# Patient Record
Sex: FEMALE | Race: BLACK OR AFRICAN AMERICAN | Employment: FULL TIME | URBAN - METROPOLITAN AREA
[De-identification: names, ages, dates, MRNs, and addresses within clinical notes are randomized per-mention and may not be internally consistent; named-entity substitution may affect disease eponyms.]

---

## 2018-03-16 VITALS
HEIGHT: 66 IN | WEIGHT: 222 LBS | HEART RATE: 77 BPM | DIASTOLIC BLOOD PRESSURE: 73 MMHG | RESPIRATION RATE: 16 BRPM | BODY MASS INDEX: 35.68 KG/M2 | OXYGEN SATURATION: 100 % | TEMPERATURE: 98 F | SYSTOLIC BLOOD PRESSURE: 143 MMHG

## 2018-03-16 PROCEDURE — 99282 EMERGENCY DEPT VISIT SF MDM: CPT

## 2018-03-17 ENCOUNTER — HOSPITAL ENCOUNTER (EMERGENCY)
Age: 43
Discharge: HOME | End: 2018-03-17
Attending: EMERGENCY MEDICINE
Payer: COMMERCIAL

## 2018-03-17 DIAGNOSIS — R68.89 FLU-LIKE SYMPTOMS: ICD-10-CM

## 2018-03-17 DIAGNOSIS — J02.9 ACUTE PHARYNGITIS, UNSPECIFIED ETIOLOGY: Primary | ICD-10-CM

## 2018-03-17 PROCEDURE — 87081 CULTURE SCREEN ONLY: CPT | Performed by: EMERGENCY MEDICINE

## 2018-03-17 RX ORDER — OSELTAMIVIR PHOSPHATE 75 MG/1
75 CAPSULE ORAL 2 TIMES DAILY
Qty: 10 CAP | Refills: 0 | Status: SHIPPED | OUTPATIENT
Start: 2018-03-17 | End: 2018-03-22

## 2018-03-17 RX ORDER — GUAIFENESIN AND PSEUDOEPHEDRINE HCL 1200; 120 MG/1; MG/1
1 TABLET, EXTENDED RELEASE ORAL 2 TIMES DAILY
Qty: 30 TAB | Refills: 0 | Status: SHIPPED | OUTPATIENT
Start: 2018-03-17

## 2018-03-17 NOTE — ED NOTES
Assumed care of patient. Patient presents with complaints of sore throat, cough and body aches x 2 days, also c/o rash to mouth. Denies any fever. Patient changed into gown and warm blankets provided. Call bell within reach of patient. Will continue to monitor and assess.

## 2018-03-17 NOTE — ED PROVIDER NOTES
EMERGENCY DEPARTMENT HISTORY AND PHYSICAL EXAM    Date: 3/17/2018  Patient Name: Sary Vance    History of Presenting Illness     Chief Complaint   Patient presents with    Flu    Rash         History Provided By: Patient    Chief Complaint: Rash to mouth  Duration: 2 days   Timing:  Constant  Location: Mouth  Severity: 8 out of 10  Associated Symptoms: sore throat and body aches    Additional History (Context):   1:29 AM  Sary Vance is a 37 y.o. female with PMHx HTN who presents ambulatory to the emergency department C/O rash to mouth (rated 8/10), onset 2 days ago. Associated sxs include sore throat and body aches. Pt reports that she is a  and has come into contact with at least 6 students with flu. Notes FHx of DM and CA. Pt denies fever, tobacco/EtOH/illicit drug use or any other sxs or complaints. PCP: Maryjane Baldwin MD    Current Outpatient Prescriptions   Medication Sig Dispense Refill    oseltamivir (TAMIFLU) 75 mg capsule Take 1 Cap by mouth two (2) times a day for 5 days. 10 Cap 0    PSEUDOEPHEDRINE-guaiFENesin (MUCINEX D MAXIMUM STRENGTH) Tb12 extended release tablet Take 1 Tab by mouth two (2) times a day. 30 Tab 0    [START ON 3/19/2018] zinc lozenges 25 mg lozg Take 30 Each by mouth every Monday and Friday. 40 Lozenge 0    TOPIRAMATE (TOPAMAX PO) Take  by mouth.  LOSARTAN-HYDROCHLOROTHIAZIDE PO Take  by mouth.  MONTELUKAST SODIUM (SINGULAIR PO) Take  by mouth.  FEXOFENADINE HCL (ALLEGRA PO) Take  by mouth. Past History     Past Medical History:  Past Medical History:   Diagnosis Date    H/O seasonal allergies     Hypertension     Migraine        Past Surgical History:  Past Surgical History:   Procedure Laterality Date    HX GYN          HX GYN      dx'd lap    HX GYN      hysterectomy       Family History:  History reviewed. No pertinent family history.     Social History:  Social History   Substance Use Topics    Smoking status: Never Smoker    Smokeless tobacco: None    Alcohol use None       Allergies:  No Known Allergies      Review of Systems   Review of Systems   Constitutional: Negative for chills, diaphoresis, fever and unexpected weight change. HENT: Positive for sore throat. Negative for congestion, drooling, ear pain, rhinorrhea, tinnitus and trouble swallowing. Eyes: Negative for photophobia, pain, redness and visual disturbance. Respiratory: Negative for cough, choking, chest tightness, shortness of breath, wheezing and stridor. Cardiovascular: Negative for chest pain, palpitations and leg swelling. Gastrointestinal: Negative for abdominal distention, abdominal pain, anal bleeding, blood in stool, constipation, diarrhea, nausea and vomiting. Genitourinary: Negative for difficulty urinating, dysuria, flank pain, frequency, hematuria and urgency. Musculoskeletal: Positive for myalgias (body aches). Negative for arthralgias, back pain and neck pain. Skin: Positive for rash (mouth). Negative for color change and wound. Neurological: Negative for dizziness, seizures, syncope, speech difficulty, light-headedness and headaches. Hematological: Does not bruise/bleed easily. Psychiatric/Behavioral: Negative for agitation, behavioral problems, hallucinations, self-injury and suicidal ideas. The patient is not hyperactive. Physical Exam     Vitals:    03/16/18 2251   BP: 143/73   Pulse: 77   Resp: 16   Temp: 98 °F (36.7 °C)   SpO2: 100%   Weight: 100.7 kg (222 lb)   Height: 5' 6\" (1.676 m)     Physical Exam   Constitutional: She is oriented to person, place, and time. She appears well-developed and well-nourished. No distress. Overweight, well apparing, nontoxic   HENT:   Head: Normocephalic and atraumatic. Right Ear: External ear normal.   Left Ear: External ear normal.   Mouth/Throat: Posterior oropharyngeal edema and posterior oropharyngeal erythema present. No oropharyngeal exudate. Throat erythema and hypertrophy, no exudates. Posterior pharynx tough to see. Mallampati 3 however, strong gag reflex and good inspection during strep throat swab reveals essentially normal findings. Eyes: Conjunctivae and EOM are normal. Pupils are equal, round, and reactive to light. No scleral icterus. No pallor   Neck: Normal range of motion. Neck supple. No JVD present. No tracheal deviation present. No thyromegaly present. Cardiovascular: Normal rate, regular rhythm and normal heart sounds. Pulmonary/Chest: Effort normal and breath sounds normal. No stridor. No respiratory distress. Bronchospastic cough   Abdominal: Soft. Bowel sounds are normal. She exhibits no distension. There is no tenderness. There is no rebound and no guarding. Musculoskeletal: Normal range of motion. She exhibits no edema or tenderness. No soft tissue injuries   Lymphadenopathy:     She has no cervical adenopathy. Neurological: She is alert and oriented to person, place, and time. She has normal reflexes. No cranial nerve deficit. Coordination normal.   Skin: Skin is warm and dry. No rash noted. She is not diaphoretic. No erythema. Psychiatric: She has a normal mood and affect. Her behavior is normal. Judgment and thought content normal.   Nursing note and vitals reviewed. Diagnostic Study Results     Labs -     Recent Results (from the past 12 hour(s))   STREP THROAT SCREEN    Collection Time: 03/17/18  1:39 AM   Result Value Ref Range    Special Requests: NO SPECIAL REQUESTS      Strep Screen NEGATIVE       Strep Screen (NOTE)  TEST PERFORMED IN LAB BY 1056         Culture result: PENDING        Radiologic Studies -    No orders to display     CT Results  (Last 48 hours)    None        CXR Results  (Last 48 hours)    None          MEDICATIONS GIVEN:  Medications - No data to display    Medical Decision Making   I am the first provider for this patient.     I reviewed the vital signs, available nursing notes, past medical history, past surgical history, family history and social history. Vital Signs-Reviewed the patient's vital signs. Pulse Oximetry Analysis - 100% on RA     Records Reviewed: Nursing Notes    Provider Notes (Medical Decision Making):   Ddx: URI, flu, or parainfluenza as well as coexisting bronchitis and/or PNA. Procedures:  Procedures    ED Course:   1:29 AM   Initial assessment performed. The patients presenting problems have been discussed, and they are in agreement with the care plan formulated and outlined with them. I have encouraged them to ask questions as they arise throughout their visit. Diagnosis and Disposition       DISCHARGE NOTE:  2:33 AM  Bosie Olszewski D Rouse's  results have been reviewed with her. She has been counseled regarding her diagnosis, treatment, and plan. She verbally conveys understanding and agreement of the signs, symptoms, diagnosis, treatment and prognosis and additionally agrees to follow up as discussed. She also agrees with the care-plan and conveys that all of her questions have been answered. I have also provided discharge instructions for her that include: educational information regarding their diagnosis and treatment, and list of reasons why they would want to return to the ED prior to their follow-up appointment, should her condition change. She has been provided with education for proper emergency department utilization. CLINICAL IMPRESSION:    1. Acute pharyngitis, unspecified etiology    2. Flu-like symptoms        PLAN:  1. D/C Home  2. Discharge Medication List as of 3/17/2018  2:33 AM      START taking these medications    Details   oseltamivir (TAMIFLU) 75 mg capsule Take 1 Cap by mouth two (2) times a day for 5 days. , Print, Disp-10 Cap, R-0      PSEUDOEPHEDRINE-guaiFENesin (MUCINEX D MAXIMUM STRENGTH) Tb12 extended release tablet Take 1 Tab by mouth two (2) times a day., Print, Disp-30 Tab, R-0      zinc lozenges 25 mg lozg Take 30 Each by mouth every Monday and Friday. , Print, Disp-40 Lozenge, R-0         CONTINUE these medications which have NOT CHANGED    Details   TOPIRAMATE (TOPAMAX PO) Take  by mouth., Historical Med      LOSARTAN-HYDROCHLOROTHIAZIDE PO Take  by mouth., Historical Med      MONTELUKAST SODIUM (SINGULAIR PO) Take  by mouth., Historical Med      FEXOFENADINE HCL (ALLEGRA PO) Take  by mouth., Historical Med           3. Follow-up Information     Follow up With Details Comments Angy Harper MD Schedule an appointment as soon as possible for a visit in 2 days for primary care follow up [address] [facility] [address] [facility]  As needed, If symptoms worsen [address] [place] [address]  [phone]        _______________________________    Attestations: This note is prepared by [employer], acting as Scribe for Isabelle. Catrachito Sierra MD.    [employer]. Catrachito Sierra MD:  The scribe's documentation has been prepared under my direction and personally reviewed by me in its entirety.   I confirm that the note above accurately reflects all work, treatment, procedures, and medical decision making performed by me.  _______________________________

## 2018-03-17 NOTE — DISCHARGE INSTRUCTIONS
Sore Throat: Care Instructions  Your Care Instructions    Infection by bacteria or a virus causes most sore throats. Cigarette smoke, dry air, air pollution, allergies, and yelling can also cause a sore throat. Sore throats can be painful and annoying. Fortunately, most sore throats go away on their own. If you have a bacterial infection, your doctor may prescribe antibiotics. Follow-up care is a key part of your treatment and safety. Be sure to make and go to all appointments, and call your doctor if you are having problems. It's also a good idea to know your test results and keep a list of the medicines you take. How can you care for yourself at home? · If your doctor prescribed antibiotics, take them as directed. Do not stop taking them just because you feel better. You need to take the full course of antibiotics. · Gargle with warm salt water once an hour to help reduce swelling and relieve discomfort. Use 1 teaspoon of salt mixed in 1 cup of warm water. · Take an over-the-counter pain medicine, such as acetaminophen (Tylenol), ibuprofen (Advil, Motrin), or naproxen (Aleve). Read and follow all instructions on the label. · Be careful when taking over-the-counter cold or flu medicines and Tylenol at the same time. Many of these medicines have acetaminophen, which is Tylenol. Read the labels to make sure that you are not taking more than the recommended dose. Too much acetaminophen (Tylenol) can be harmful. · Drink plenty of fluids. Fluids may help soothe an irritated throat. Hot fluids, such as tea or soup, may help decrease throat pain. · Use over-the-counter throat lozenges to soothe pain. Regular cough drops or hard candy may also help. These should not be given to young children because of the risk of choking. · Do not smoke or allow others to smoke around you. If you need help quitting, talk to your doctor about stop-smoking programs and medicines.  These can increase your chances of quitting for good. · Use a vaporizer or humidifier to add moisture to your bedroom. Follow the directions for cleaning the machine. When should you call for help? Call your doctor now or seek immediate medical care if:  ? · You have new or worse trouble swallowing. ? · Your sore throat gets much worse on one side. ? Watch closely for changes in your health, and be sure to contact your doctor if you do not get better as expected. Where can you learn more? Go to http://miya-luis.info/. Enter [phone] in the search box to learn more about \"Sore Throat: Care Instructions. \"  Current as of: May 12, 2017  Content Version: 11.4  © 8704-2851 [employer], [employer]. Care instructions adapted under license by [employer] (which disclaims liability or warranty for this information). If you have questions about a medical condition or this instruction, always ask your healthcare professional. [address] any warranty or liability for your use of this information.

## 2018-03-17 NOTE — ED NOTES
I have reviewed discharge instructions with the patient. The patient verbalized understanding. Patient armband removed and shredded    3 prescriptions given and reviewed with patient. Patient d/c home in stable condition.

## 2018-03-17 NOTE — LETTER
[facility] [place] 
[facility] 
[address] [address] 
[phone] Work/School Note Date: 3/16/2018 To Whom It May concern: 
 
Celeste Hdez was seen and treated today in the emergency room by the following provider(s): 
Attending Provider: Carol Nelson MD.   
 
Celeste Hdez may return to work when fever free for more than 24 hours without the use of Tylenol or Motrin. Sincerely, 
 
 
 
 
 
 
[employer].  Fito Dietz MD

## 2018-03-19 LAB
B-HEM STREP THROAT QL CULT: NEGATIVE
B-HEM STREP THROAT QL CULT: NORMAL
BACTERIA SPEC CULT: NORMAL
SERVICE CMNT-IMP: NORMAL

## 2018-10-24 ENCOUNTER — HOSPITAL ENCOUNTER (OUTPATIENT)
Dept: PHYSICAL THERAPY | Age: 43
Discharge: HOME | End: 2018-10-24
Payer: COMMERCIAL

## 2018-10-24 PROCEDURE — 97161 PT EVAL LOW COMPLEX 20 MIN: CPT | Performed by: PHYSICAL THERAPIST

## 2018-10-24 PROCEDURE — 97110 THERAPEUTIC EXERCISES: CPT | Performed by: PHYSICAL THERAPIST

## 2018-10-24 NOTE — PROGRESS NOTES
PT DAILY TREATMENT NOTE/CERVICAL [id number] Patient Name: Raquel Lisa Date:10/24/2018 : 1975 [x]  Patient  Verified Payor: BLUE CROSS / Plan: [facility] PPO / Product Type: PPO / In time:5:30  Out time:6:30 Total Treatment Time (min):60 Visit #: 1 of 6 Medicare/BCBS Only Total Timed Codes (min):  30 1:1 Treatment Time:  60  
 
Treatment Area: Neck pain [M54.2] Pain in right shoulder [M25.511] SUBJECTIVE Pain Level (0-10 scale): 4 
[]constant []intermittent []improving []worsening []no change since onset Any medication changes, allergies to medications, adverse drug reactions, diagnosis change, or new procedure performed?: [x] No    [] Yes (see summary sheet for update) Subjective functional status/changes:    
Patient has CC of right shoulder and neck pain for 6-7weeks. 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 Patient describes pain as nagging, throbbing, constant. Pain is located in Right anterior shoulder and neck and occasionally radiates down the arm. Pain is worse in PM. Denies numbness/tingling. Denies popping/clicking. Aggravating factors:lifting above head, laying on right side. Alleviating factors: not using it, rest.  Denies red flags: SOB, chest pain, dizziness/lightheadedness, blurred/double vision, HA, chills/fevers, night sweats, change in bowel/bladder control, abdominal pain, difficulty swallowing, slurred speech, unexplained weight gain/loss. PMHx: DMII,  HTN. Surgical Hx: unremarkable. Social Hx:  home,denies alcohol, denies tobacco, work status: . PLOF: water aerobics. CLOF: same (unable to do more strenuous exercise). Diagnostic Imaging: NA OBJECTIVE/EXAMINATION 
 
30 min [x]Eval                  []Re-Eval  
 
 
30 min Therapeutic Exercise:  [x] See flow sheet :  
Rationale: increase ROM, increase strength and decrease pain to improve the patients ability to complete ADLs With 
 [] TE 
 [] TA 
 [] neuro 
 [] other: Patient Education: [x] Review HEP [] Progressed/Changed HEP based on:  
[] positioning   [] body mechanics   [] transfers   [] heat/ice application   
[] other:   
 
 
Physical Therapy Evaluation Cervical Spine OBJECTIVEPosture: [] WNL Head Position: 
Shoulder/Scapular Position: 
C-Kyphosis:  [] increased   [] decreased C-Lordosis:   [] increased   [] decreased T-Kyphosis:  [] increased   [] decreased T-Lordosis:   [] increased   [] decreased TMJ: [] N/A [] Abnormal - ROM: 
 Palpation: 
 
Cervical Retraction: [] WNL    [] Abnormal: 
 
Shoulder/Scapular Screen: [] WNL    [] Abnormal: 
 
Active Movements: [] N/A   [] Too acute   [] Other: pain in rotation in SB planes ROM % AROM % PROM Comments:pain, area Forward flexion 100 Extension 75 SB right 75 SB left  75 Rotation right 75 Rotation left 75 Thoracic Spine: [] N/A    [] WNL   [] Other: PROM: 
 
Palpation: 
[x] Min  [] Mod  [] Severe    Location: 
[] Min  [] Mod  [] Severe    Location: 
[] Min  [] Mod  [] Severe    Location: 
 
 
UE Strength:   [] Unable to assess at this time L (1-5) R (1-5) Pain Flexion 4 4 [] Yes   [] No  
Abduction 4 4 [x] Yes   [] No  
ER 4 4 [] Yes   [] No  
IR 4+ 4+ [] Yes   [] No  
Extension 5 5 [] Yes   [] No  
Elbow flexion 5 5 [] Yes   [] No  
Elbow Ext 4 4 [] Yes   [] No  
Wrist Flexion 5 5 [] Yes   [] No  
Wrist Extension 5 5 [] Yes   [] No  
Thumb extension 4 5 [] Yes   [] No  
Finger Abduction 4 4 [] Yes   [] No  
 
 
 
Upper Limb Tension Tests: [] N/A Ulnar: [] R    [] L    [] +    [] - Median: [] R    [x] L    [x] +    [] - Radial: [] R    [] L    [] +    [] - Special Tests:  Cervical:  
     Vertebral Artery:  [] R    [] L    [] +    [] - Alar Ligament: [] R    [] L    [] +    [] -      Transverse Lig: [] R    [] L    [] +    [] - 
 Spurling's:  [] R    [] L    [] +    [x] - Distraction:  [] R    [] L    [] +    [x] - Compression: [] R    [] L    [] +    [x] - Other tests/comments: 
  
 
Pain Level (0-10 scale) post treatment: 1 ASSESSMENT/Changes in Function: Patient presents with signs/symptoms of right cervical radiculopathy. Has reduction of symptoms with cervical retractions. Patient will continue to benefit from skilled PT services to modify and progress therapeutic interventions, address functional mobility deficits, address ROM deficits, address strength deficits, analyze and address soft tissue restrictions, analyze and cue movement patterns, analyze and modify body mechanics/ergonomics and assess and modify postural abnormalities to attain remaining goals. [x]  See Plan of Care 
[]  See progress note/recertification 
[]  See Discharge Summary Progress towards goals / Updated goals: 
See POC PLAN 
[]  Upgrade activities as tolerated     [x]  Continue plan of care 
[]  Update interventions per flow sheet      
[]  Discharge due to:_ 
[]  Other:_ Franco Montague PT, DPT 10/24/2018  5:37 PM

## 2018-10-24 NOTE — PROGRESS NOTES
[facility] at [address] [place] [address], [address] [place] [address] Phone: [phone]   Fax: [phone] Plan of Care/ Statement of Necessity for Physical Therapy Services Patient name: Mindy Liriano Start of Care: 10/24/2018 Referral source: Direct Access PCP: Rolando Carlton MD : 1975 Medical Diagnosis: Neck pain [M54.2] Pain in right shoulder [M25.511] Onset Date: Treatment Diagnosis: right shoulder pain, neck pain Prior Hospitalization: see medical history Provider#: [id number] Medications: Verified on Patient summary List  
 Comorbidities: DMII, HTN, latex allergy Prior Level of Function: water aerobics The Plan of Care and following information is based on the information from the initial evaluation. Assessment/ key information: Patient presents with signs/symptoms of right cervical radiculopathy. Has reduction of symptoms with cervical retractions. Patient will continue to benefit from skilled PT services to modify and progress therapeutic interventions, address functional mobility deficits, address ROM deficits, address strength deficits, analyze and address soft tissue restrictions, analyze and cue movement patterns, analyze and modify body mechanics/ergonomics and assess and modify postural abnormalities to attain remaining goals. Evaluation Complexity History MEDIUM  Complexity : 1-2 comorbidities / personal factors will impact the outcome/ POC ; Examination LOW Complexity : 1-2 Standardized tests and measures addressing body structure, function, activity limitation and / or participation in recreation  ;Presentation LOW Complexity : Stable, uncomplicated  ;Clinical Decision Making MEDIUM Complexity : FOTO score of 26-74 Overall Complexity Rating: LOW Problem List: pain affecting function, decrease ROM, decrease strength, decrease ADL/ functional abilitiies and decrease activity tolerance Treatment Plan may include any combination of the following: Therapeutic exercise, Therapeutic activities, Neuromuscular re-education, Physical agent/modality, Manual therapy, Aquatic therapy, Patient education and Self Care training Patient / Family readiness to learn indicated by: asking questions, trying to perform skills and interest 
Persons(s) to be included in education: patient (P) Barriers to Learning/Limitations: None Patient Goal (s): reduce pain/take away pain Patient Self Reported Health Status: fair Rehabilitation Potential: good Short Term Goals: To be accomplished in 2 weeks: 
 Patient will report compliance with HEP 1x/day to aid in rehabilitation program. 
 Status at IE: NA 
 Current: 
  
 Patient will increase cervical ROM to 100% in all planes to aid in completion of ADLs. Status at IE: 75% Current: 
 
Long Term Goals: To be accomplished in 4 weeks: 
 Patient will increase B UE strength to 5/5 MMT throughout to aid in completion of ADLs. Status at IE:4/5 in shoulders Current: 
 
 Patient will report pain no greater than 1-2/10 throughout entire day to aid in completion of ADLs. Status at IE:4 Current: 
 
 Patent will be able to lift 10lbs overhead to be able to return to full work duties. Status at IE: pain raising right arm into abduction Current: 
 
 Patient will improve FOTO score to 67 points to demonstrate improvement in functional status. Status at IE:47 and 48 Current: 
 
 
Frequency / Duration: Patient to be seen 1 times per week for 6 weeks. Patient/ Caregiver education and instruction: Diagnosis, prognosis, self care, activity modification and exercises 
 [x]  Plan of care has been reviewed with KOKI Zapien PT, DPT 10/24/2018 7:06 PM 
_____________________________________________________________________ I certify that the above Therapy Services are being furnished while the patient is under my care. I agree with the treatment plan and certify that this therapy is necessary. [de-identified] Signature:____________Date:_________TIME:________ 
 
[facility], Date and Time must be completed for valid certification ** Please sign and return to [facility] at [address] Phone: [phone]   Fax: [phone]

## 2018-10-30 ENCOUNTER — HOSPITAL ENCOUNTER (OUTPATIENT)
Dept: PHYSICAL THERAPY | Age: 43
Discharge: HOME | End: 2018-10-30
Payer: COMMERCIAL

## 2018-10-30 PROCEDURE — 97110 THERAPEUTIC EXERCISES: CPT

## 2018-10-30 NOTE — PROGRESS NOTES
PT DAILY TREATMENT NOTE  Patient Name: Tani Lobo Date:10/30/2018 : 1975 [x]  Patient  Verified Payor: BLUE CROSS / Plan: [facility] PPO / Product Type: PPO / In time:5:02  Out time:5:41 Total Treatment Time (min): 39 Visit #: 2 of 6 Treatment Area: Neck pain [M54.2] Pain in right shoulder [M25.511] SUBJECTIVE Pain Level (0-10 scale): 310 Any medication changes, allergies to medications, adverse drug reactions, diagnosis change, or new procedure performed?: [x] No    [] Yes (see summary sheet for update) Subjective functional status/changes:   [] No changes reported \"I have some pain today. It's not as bad though. \" OBJECTIVE 39 min Therapeutic Exercise:  [x] See flow sheet :  
Rationale: increase ROM, increase strength and improve coordination to improve the patients ability to return to prior level of physical activity. With 
 [] TE 
 [] TA 
 [] neuro 
 [] other: Patient Education: [x] Review HEP [] Progressed/Changed HEP based on:  
[] positioning   [] body mechanics   [] transfers   [] heat/ice application   
[] other:   
 
Other Objective/Functional Measures:   
 
Pain Level (0-10 scale) post treatment: 3/10 ASSESSMENT/Changes in Function: Pt continues to be challenged by therex with no increased pain. Pt had increased fatigue with shoulder therex but not above tolerance. Pt had no adverse affects post tx. Patient will continue to benefit from skilled PT services to modify and progress therapeutic interventions, address functional mobility deficits, address ROM deficits, address strength deficits, analyze and address soft tissue restrictions, analyze and cue movement patterns, analyze and modify body mechanics/ergonomics and assess and modify postural abnormalities to attain remaining goals. []  See Plan of Care 
[]  See progress note/recertification 
[]  See Discharge Summary Progress towards goals / Updated goals: Short Term Goals: To be accomplished in 2 weeks: 
            Patient will report compliance with HEP 1x/day to aid in rehabilitation program. 
            Status at IE: NA 
            Current: 
  
            Patient will increase cervical ROM to 100% in all planes to aid in completion of ADLs. Status at IE: 75% Current: 
  
Long Term Goals: To be accomplished in 4 weeks: 
            Patient will increase B UE strength to 5/5 MMT throughout to aid in completion of ADLs. Status at IE:4/5 in shoulders Current: 
  
            Patient will report pain no greater than 1-2/10 throughout entire day to aid in completion of ADLs. Status at IE:4 Current: 
  
            Patent will be able to lift 10lbs overhead to be able to return to full work duties. Status at IE: pain raising right arm into abduction Current: 
  
            Patient will improve FOTO score to 67 points to demonstrate improvement in functional status. Status at IE:47 and 48 Current: PLAN [x]  Upgrade activities as tolerated     [x]  Continue plan of care 
[]  Update interventions per flow sheet      
[]  Discharge due to:_ 
[]  Other:_ Valeriy Connors PTA 10/30/2018  6:27 PM 
 
Future Appointments Date Time Provider Bertha Luna 11/6/2018  5:30 PM Merna Sandifer, PTA [facility]  
11/14/2018  5:30 PM Sujit [facility]  
11/19/2018  5:30 PM Guy Mendoza, PT, DPT [facility]

## 2018-11-06 ENCOUNTER — APPOINTMENT (OUTPATIENT)
Dept: PHYSICAL THERAPY | Age: 43
End: 2018-11-06
Payer: COMMERCIAL

## 2018-11-14 ENCOUNTER — HOSPITAL ENCOUNTER (OUTPATIENT)
Dept: PHYSICAL THERAPY | Age: 43
Discharge: HOME | End: 2018-11-14
Payer: COMMERCIAL

## 2018-11-14 PROCEDURE — 97110 THERAPEUTIC EXERCISES: CPT

## 2018-11-14 NOTE — PROGRESS NOTES
PT DAILY TREATMENT NOTE  Patient Name: Omar Robertson Date:2018 : 1975 [x]  Patient  Verified Payor: BLUE CROSS / Plan: [facility][1] PPO / Product Type: PPO / In time: 5:30  Out time:6:25 Total Treatment Time (min): 55 Visit #: 3 of 6 Treatment Area: Neck pain [M54.2] Pain in right shoulder [M25.511] SUBJECTIVE Pain Level (0-10 scale): 5-6/10 Any medication changes, allergies to medications, adverse drug reactions, diagnosis change, or new procedure performed?: [x] No    [] Yes (see summary sheet for update) Subjective functional status/changes:   [] No changes reported \"I have some pain in my neck and shoulder. \" OBJECTIVE 55 min Therapeutic Exercise:  [x] See flow sheet :  
Rationale: increase ROM, increase strength and improve coordination to improve the patients ability to return to prior level of physical activity. With 
 [] TE 
 [] TA 
 [] neuro 
 [] other: Patient Education: [x] Review HEP [] Progressed/Changed HEP based on:  
[] positioning   [] body mechanics   [] transfers   [] heat/ice application   
[] other:   
 
Other Objective/Functional Measures:   
 
Pain Level (0-10 scale) post treatment: 0/10 sore ASSESSMENT/Changes in Function: Pt tolerated therapy session well with no increased pain. Pt progressed to D2 flex/ext with no increased pain. Pt required VC to discourage shoulder hike compensation. Pt reported decreased pain post tx. Patient will continue to benefit from skilled PT services to modify and progress therapeutic interventions, address functional mobility deficits, address ROM deficits, address strength deficits, analyze and address soft tissue restrictions, analyze and cue movement patterns, analyze and modify body mechanics/ergonomics and assess and modify postural abnormalities to attain remaining goals. []  See Plan of Care 
[]  See progress note/recertification 
[]  See Discharge Summary Progress towards goals / Updated goals: 
Short Term Goals: To be accomplished in 2 weeks: 
            Patient will report compliance with HEP 1x/day to aid in rehabilitation program. 
            BZAVOO at IE: NA 
            Current: Pt reports occasional compliance 
  
            Patient will increase cervical ROM to 100% in all planes to aid in completion of ADLs.             IPGCWC at IE: 75%             WTEVJPH: 
  
Long Term Goals: To be accomplished in 4 weeks: 
            Patient will increase B UE strength to 5/5 MMT throughout to aid in completion of ADLs.             Status at IE:4/5 in shoulders             CNJKHJD: 
  
            NPPCOKJ will report pain no greater than 1-2/10 throughout entire day to aid in completion of ADLs.             KFUQVJ at IE:4 
            YAWNSEP: 
  
            Patent will be able to lift 10lbs overhead to be able to return to full work duties.             RHMLPW at IE: pain raising right arm into abduction             INXMNLL: 
  
            VEHUTUF will improve FOTO score to 67 points to demonstrate improvement in functional status.             BDCXQW at IE:47 and 48 
            Current: 
  
 
 
 
PLAN [x]  Upgrade activities as tolerated     [x]  Continue plan of care 
[]  Update interventions per flow sheet      
[]  Discharge due to:_ 
[]  Other:_ Charmaine Peng PTA 11/14/2018  5:36 PM 
 
Future Appointments Date Time Provider Bertha Luna 11/19/2018  5:30 PM Kristopher Trinidad, PT, DPT [facility]

## 2018-11-28 ENCOUNTER — HOSPITAL ENCOUNTER (OUTPATIENT)
Dept: PHYSICAL THERAPY | Age: 43
Discharge: HOME | End: 2018-11-28
Payer: COMMERCIAL

## 2018-11-28 PROCEDURE — 97110 THERAPEUTIC EXERCISES: CPT | Performed by: PHYSICAL THERAPIST

## 2018-11-28 NOTE — PROGRESS NOTES
PT DAILY TREATMENT NOTE  Patient Name: Larry Danielson Date:2018 : 1975 [x]  Patient  Verified Payor: BLUE CROSS / Plan: [facility] PPO / Product Type: PPO / In time:5:25  Out time:6;12 Total Treatment Time (min): 47 Visit #: 4 of 6 Treatment Area: Cervicalgia [M54.2] Pain in right shoulder [M25.511] SUBJECTIVE Pain Level (0-10 scale): 0 Any medication changes, allergies to medications, adverse drug reactions, diagnosis change, or new procedure performed?: [x] No    [] Yes (see summary sheet for update) Subjective functional status/changes:   [] No changes reported Feels good. No new issues. OBJECTIVE 47 min Therapeutic Exercise:  [x] See flow sheet :  
Rationale: increase ROM, increase strength and decrease pain to improve the patients ability to complete ADLs Access Code: [email or web address]  
URL: [email or web address]  
Date: 2018 Prepared by: Marylin Klein Exercises Split Stance Shoulder Row with Resistance - 10 reps - 2 sets - 3x weekly Shoulder Extension with Resistance - 10 reps - 2 sets - 3x weekly Standing Shoulder Horizontal Abduction with Resistance - 10 reps - 2 sets - 3x weekly Shoulder Overhead Press in Abduction with Dumbbells - 10 reps - 2 sets - 3x weekly Standing Shoulder Single Arm PNF D2 Flexion with Resistance - 10 reps - 2 sets - 3x weekly Standing Shoulder Single Arm PNF D2 Extension with Anchored Resistance - 10 reps - 2 sets - 3x weekly With 
 [] TE 
 [] TA 
 [] neuro 
 [] other: Patient Education: [x] Review HEP [] Progressed/Changed HEP based on:  
[] positioning   [] body mechanics   [] transfers   [] heat/ice application   
[] other:   
 
Other Objective/Functional Measures: MMT: 5/5; ROM WNL Pain Level (0-10 scale) post treatment: 0 
 
ASSESSMENT/Changes in Function: Patient responds well to treatment session. David has made good progress to date. Pain is mostly reduced. Will continue . No adverse effects were noted from today's treatment session Patient will continue to benefit from skilled PT services to modify and progress therapeutic interventions, address functional mobility deficits, address ROM deficits, address strength deficits, analyze and address soft tissue restrictions, analyze and cue movement patterns, analyze and modify body mechanics/ergonomics, assess and modify postural abnormalities []  See Plan of Care [x]  See progress note/recertification 
[]  See Discharge Summary Progress towards goals / Updated goals: 
Short Term Goals: To be accomplished in 2 weeks: 
            Patient will report compliance with HEP 1x/day to aid in rehabilitation program. 
            WUNMIV at IE: NA 
            Current: Pt reports occasional compliance 
  
            Patient will increase cervical ROM to 100% in all planes to aid in completion of ADLs.             NARTMC at IE: 75%             XFIYVUS: 100%, met 11/28/2018 
  
Long Term Goals: To be accomplished in 4 weeks: 
            Patient will increase B UE strength to 5/5 MMT throughout to aid in completion of ADLs.             Status at IE:4/5 in shoulders             YAQFVTE: 5/5; MET 11/28/2018 
  
            Patient will report pain no greater than 1-2/10 throughout entire day to aid in completion of ADLs.             OJICDL at IE:4 
            DFJUVQI:  3/10, progressing 
  
            Patent will be able to lift 10lbs overhead to be able to return to full work duties.             MLXXDB at IE: pain raising right arm into abduction             Current:NV 
  
            Patient will improve FOTO score to 67 points to demonstrate improvement in functional status.  
            DGDFOH at IE:47 and 48 
            Current: NV 
 
 
 
PLAN 
[]  Upgrade activities as tolerated     [x]  Continue plan of care 
[]  Update interventions per flow sheet      
[]  Discharge due to:_ 
[]  Other:_   
 
 Perez Olivia, PT, DPT 11/28/2018  5:45 PM 
 
No future appointments.

## 2018-11-29 NOTE — PROGRESS NOTES
[facility] at [address] [place] [address], [address] [place] [address] Phone: [phone]   Fax: [phone] Progress Note Patient name: Alan Florian Start of Care: 10/24/2018 Referral source: Direct Access PCP: Hellen Koyanagi, MD  
 : 1975 Medical/Treatment Diagnosis: Cervicalgia [M54.2] Pain in right shoulder [M25.511] Onset Date: Prior Hospitalization: see medical history Provider#: [id number] Medications: Verified on Patient Summary List   
Comorbidities: DMII, HTN, latex allergy Prior Level of Function:water aerobics Visits from Start of Care: 4    Missed Visits: 0 Established Goals:          Excellent Good         Limited           None 
[x] Increased ROM   []  [x]  []  [] 
[] Increased Strength  []  [x]  []  [] 
[x] Increased Mobility  []  [x]  []  []  
[x] Decreased Pain   []  [x]  []  [] 
[] Decreased Swelling  []  []  []  [] 
 
Key Functional Changes: see below Updated Goals: to be achieved in 4 weeks: 
 Short Term Goals: To be accomplished in 2 weeks: 
            Patient will report compliance with HEP 1x/day to aid in rehabilitation program. 
            YMKVIW at IE: NA 
            Current: Pt reports occasional compliance 
  
            Patient will increase cervical ROM to 100% in all planes to aid in completion of ADLs.             UYXEHF at IE: 75%             HRLHEFC: 100%, met 2018 
  
Long Term Goals: To be accomplished in 4 weeks: 
            Patient will increase B UE strength to 5/5 MMT throughout to aid in completion of ADLs.             Status at IE:4/5 in shoulders             ZIOPVA/5; MET 2018 
  
            Patient will report pain no greater than 1-2/10 throughout entire day to aid in completion of ADLs.             WARRFY at IE:4 
            ZHQPJZX:  3/10, progressing 
  
            Patent will be able to lift 10lbs overhead to be able to return to full work duties.             CYIGYF at IE: pain raising right arm into abduction             Current:NV 
  
            Patient will improve FOTO score to 67 points to demonstrate improvement in functional status.             ETCKTW at IE:47 and 48 
            Current: NV 
  
 
 
ASSESSMENT/RECOMMENDATIONS: 
Patient responds well to treatment session. Patient has made good progress to date. Pain is mostly reduced. Will f/u in clinic in 2-3 weeks time (depending on patient schedule),to allow patient to trial independent HEP. Will determine further need for care at that time . No adverse effects were noted from today's treatment session. 
  
Patient will continue to benefit from skilled PT services to modify and progress therapeutic interventions, address functional mobility deficits, address ROM deficits, address strength deficits, analyze and address soft tissue restrictions, analyze and cue movement patterns, analyze and modify body mechanics/ergonomics, assess and modify postural abnormalities [x]Continue therapy per initial plan/protocol at a frequency of  1 x per week for 4 weeks []Continue therapy with the following recommended changes:_____________________      _____________________________________________________________________ []Discontinue therapy progressing towards or have reached established goals []Discontinue therapy due to lack of appreciable progress towards goals []Discontinue therapy due to lack of attendance or compliance []Await Physician's recommendations/decisions regarding therapy []Other:________________________________________________________________ Thank you for this referral.  
Deandre Carranza, PT, DPT 11/28/2018 7:08 PM 
NOTE TO PHYSICIAN:  PLEASE COMPLETE THE ORDERS BELOW AND  
FAX TO [facility]: [phone] If you are unable to process this request in 24 hours please contact our office:  
[phone] []  I have read the above report and request that my patient continue as recommended. []  I have read the above report and request that my patient continue therapy with the following changes/special instructions:________________________________________ []I have read the above report and request that my patient be discharged from therapy. Physicians signature: ______________________________Date: ______Time:______

## 2018-12-10 ENCOUNTER — HOSPITAL ENCOUNTER (OUTPATIENT)
Dept: PHYSICAL THERAPY | Age: 43
Discharge: HOME | End: 2018-12-10
Payer: COMMERCIAL

## 2018-12-10 PROCEDURE — 97110 THERAPEUTIC EXERCISES: CPT | Performed by: PHYSICAL THERAPIST

## 2018-12-10 NOTE — PROGRESS NOTES
[facility] at [address] [place] [address], [address] [place] [address] Phone: [phone]   Fax: [phone] Discharge Summary Patient name: Mj Cam     Start of Care: 10/24/2018 Referral source: Sigrid Hooker MD    : 1975 Medical/Treatment Diagnosis: Cervicalgia [M54.2] Pain in right shoulder [M25.511]  Onset Date:2018 Prior Hospitalization: see medical history   Provider#: [id number] Comorbidities: DMII, HTN, latex allergy Prior Level of Function:water aerobics Medications: Verified on Patient Summary List 
 
Visits from Start of Care: 5    Missed Visits: 0 Short Term Goals: To be accomplished in 2 weeks: 
            Patient will report compliance with HEP 1x/day to aid in rehabilitation program. 
            OTZSHX at IE: NA 
            Current: Pt reports occasional compliance 
  
            Patient will increase cervical ROM to 100% in all planes to aid in completion of ADLs.             COQNBG at IE: 75%             WNZHHHT: 100%, met 2018 
  
Long Term Goals: To be accomplished in 4 weeks: 
            Patient will increase B UE strength to 5/5 MMT throughout to aid in completion of ADLs.             Status at IE:4/5 in shoulders             UPRPQYC: 5/5; MET 2018 
  
            Patient will report pain no greater than 1-2/10 throughout entire day to aid in completion of ADLs.             BDZDJG at IE:4 
            LLDADPQ:  0 pain, MET   
            Patent will be able to lift 10lbs overhead to be able to return to full work duties.             EFTNVM at IE: pain raising right arm into abduction             Current:10lbs overheard   
            Patient will improve FOTO score to 67 points to demonstrate improvement in functional status.             JUOUKT at IE:47 and 48 
            EMESLTF: 87, 70 Assessment/ Summary of Care: Patient responds well to treatment session. Patient has made good progress to date. Pain is resolved and patient is independent with HEP. Will discharge to independent program at this time RECOMMENDATIONS: 
[x]Discontinue therapy: [x]Patient has reached or is progressing toward set goals []Patient is non-compliant or has abdicated 
    []Due to lack of appreciable progress towards set goals Marylin Klein PT, DPT 12/10/2018 5:38 PM

## 2018-12-10 NOTE — PROGRESS NOTES
PT DAILY TREATMENT NOTE  Patient Name: Manuel Tran Date:12/10/2018 : 1975 [x]  Patient  Verified Payor: BLUE CROSS / Plan: [facility] PPO / Product Type: PPO / In time:4:52  Out time:5:30 Total Treatment Time (min): 38 Visit #: 5 of 10 Treatment Area: Cervicalgia [M54.2] Pain in right shoulder [M25.511] SUBJECTIVE Pain Level (0-10 scale): 0 Any medication changes, allergies to medications, adverse drug reactions, diagnosis change, or new procedure performed?: [x] No    [] Yes (see summary sheet for update) Subjective functional status/changes:   [] No changes reported Feels alright. No pain, just doesn't like the cold weather OBJECTIVE 38 min Therapeutic Exercise:  [x] See flow sheet :  
Rationale: increase ROM, increase strength and decrease pain to improve the patients ability to complete ADLs With 
 [] TE 
 [] TA 
 [] neuro 
 [] other: Patient Education: [x] Review HEP [] Progressed/Changed HEP based on:  
[] positioning   [] body mechanics   [] transfers   [] heat/ice application   
[] other:   
 
Other Objective/Functional Measures:   
 
Pain Level (0-10 scale) post treatment: 0 
 
ASSESSMENT/Changes in Function: Patient responds well to treatment session. Patient has made good progress to date. Pain is resolved and patient is independent with HEP. Will discharge to independent program at this time. No adverse effects were noted from today's treatment session 
 
  
[]  See Plan of Care 
[]  See progress note/recertification 
[x]  See Discharge Summary Progress towards goals / Updated goals: 
           Short Term Goals: To be accomplished in 2 weeks: 
            Patient will report compliance with HEP 1x/day to aid in rehabilitation program. 
            OIFMBE at IE: NA 
            Current: Pt reports occasional compliance 
  
            Patient will increase cervical ROM to 100% in all planes to aid in completion of ADLs.             BINVVM at IE: 75%             THTYVMI: 100%, met 11/28/2018 
  
Long Term Goals: To be accomplished in 4 weeks: 
            Patient will increase B UE strength to 5/5 MMT throughout to aid in completion of ADLs.             Status at IE:4/5 in shoulders             QQRKXUL: 5/5; MET 11/28/2018 
  
            Patient will report pain no greater than 1-2/10 throughout entire day to aid in completion of ADLs.             IGEDVY at IE:4 
            FLFSAHB:  0 pain, MET   
            Patent will be able to lift 10lbs overhead to be able to return to full work duties.             YFTTWJ at IE: pain raising right arm into abduction             Current:10lbs overheard   
            Patient will improve FOTO score to 67 points to demonstrate improvement in functional status.             LBLLNB at IE:47 and 48 
            STIJFQZ: 61, 74 PLAN 
[]  Upgrade activities as tolerated     []  Continue plan of care 
[]  Update interventions per flow sheet [x]  Discharge due to:_ 
[]  Other:_ Nan Schuster, PT, DPT 12/10/2018  4:50 PM 
 
Future Appointments Date Time Provider Bertha Luna 12/10/2018  5:00 PM Chad Siddiqui, PT, DPT [facility]

## 2024-10-09 ENCOUNTER — TELEPHONE (OUTPATIENT)
Facility: HOSPITAL | Age: 49
End: 2024-10-09

## 2024-10-09 NOTE — TELEPHONE ENCOUNTER
pt cxl<24 pt apparently had an evaluation for PF at another clinic (not a sister clinic of ours) & does not want to go through the evaluation process again, just wants to start PT. pt has been informed that since this is her first appt with us she does need to go through the evaluation process so that the therapist can determine her plan of care. Pt said she wants to cxl & call back to r/s. This is pt's 2nd eval cxl, pt has been informed of our policy & that we will see her same day if we have availability but not r/s. Pt disconnected call.